# Patient Record
Sex: MALE | Race: ASIAN | NOT HISPANIC OR LATINO | URBAN - METROPOLITAN AREA
[De-identification: names, ages, dates, MRNs, and addresses within clinical notes are randomized per-mention and may not be internally consistent; named-entity substitution may affect disease eponyms.]

---

## 2018-05-26 ENCOUNTER — EMERGENCY (EMERGENCY)
Facility: HOSPITAL | Age: 63
LOS: 1 days | Discharge: HOME | End: 2018-05-26
Attending: EMERGENCY MEDICINE | Admitting: EMERGENCY MEDICINE
Payer: MEDICAID

## 2018-05-26 VITALS
SYSTOLIC BLOOD PRESSURE: 127 MMHG | HEART RATE: 86 BPM | DIASTOLIC BLOOD PRESSURE: 90 MMHG | TEMPERATURE: 98 F | RESPIRATION RATE: 18 BRPM

## 2018-05-26 DIAGNOSIS — Z93.1 GASTROSTOMY STATUS: Chronic | ICD-10-CM

## 2018-05-26 PROCEDURE — 99283 EMERGENCY DEPT VISIT LOW MDM: CPT

## 2018-05-26 NOTE — PROVIDER CONTACT NOTE (OTHER) - ASSESSMENT
SW contacted by medical team, Pt requires assistance to return home. SW met with Pt and family. Pt requires ambulance to return home. SHAWNA contacted [facility] [phone] spoke with staff Idris who stated Pt not eligible for transport with them. SHAWNA contacted [employer] [phone] unable to get a  confirmed ambulance via [facility] by 4:00 p.m. SHAWNA informed Pt, family and medical team. No further SW intervention required at this time. SHAWNA contacted by medical team, Pt requires assistance to return home. SW met with Pt and family. Pt requires ambulance to return home. SHAWNA contacted [facility] [phone] spoke with staff Idris who stated Pt not eligible for transport with them. SHAWNA contacted [employer] [phone] unable to get a  after hours staff.  SHAWNA contacted [facility]. [facility] [employer] [phone] spoke with Kerry who could not find Pt in system, not eligible with them.  She gave transport covered by [facility] [phone].  SHAWNA spoke with family who could provide medicaid # [id number].  [facility] contacted [facility] spoke with          for ambulance bill back pending auth.  confirmed ambulance via [facility] by  p.m. SHAWNA informed Pt, family and medical team. No further SHAWNA intervention required at this time. SW contacted by medical team, Pt requires assistance to return home. SW met with Pt and family. Pt requires ambulance to return home. SHAWNA contacted [facility] [phone] spoke with staff Idris who stated Pt not eligible for transport with them. SHAWNA contacted [employer] [phone] unable to get a  after hours staff.  SHAWNA contacted [facility]. [employer] [phone] spoke with Kerry who could not find Pt in system, not eligible with them.  She gave transport covered by [facility] [phone].  SHAWNA spoke with family who also provided medicaid # [id number].  SHAWNA contacted [facility] spoke with Muna for ambulance Trip#[id number] for ambulance bill back pending auth.  Confirmed ambulance via [facility] by 5:30p.m. SHAWNA informed Pt, family and medical team. No further SHAWNA intervention required at this time.

## 2018-05-26 NOTE — ED PROVIDER NOTE - OBJECTIVE STATEMENT
62M hx Alzheimer's, hosp. last year for PNA, had severely dec. po intake and PEG placed in hosp. Per son, pt. rec'd only short period of feeds in hosp, then was sent to [facility] but did not stay there because son says "[facility] was bad for him, had diff. caring for him and wouldn't feed him." Pt. has been home with home aide and family for 8 months and has not used PEG tube in that time. Aide states pt. is usual mental status and occ. lisas, called EMS due to seeing blood at PEG site and is more agitated from being in the hospital now. No recent fever/chills or illness, pt. does not usually pull at tube.

## 2018-05-26 NOTE — ED PROVIDER NOTE - MEDICAL DECISION MAKING DETAILS
62M c/o irritation and bleeding at PEG site noted this a.m. Son states has been slated for removal of PEG but has not been able to get GI MD to come to house. PMD comes to house monthly and for last 8 months family has been awaiting removal. Unable to flush PEG, currently unusable for feeds or meds and pt. taking all by mouth. Will remove PEG, evaluate for signs of deep infection and dc home.

## 2018-05-26 NOTE — ED PROVIDER NOTE - SKIN LOCATION #1
abdomen/PEG tube in place with surrounding ST swelling and blood, no pus, no fluctuance, no diffuse erythema, no warmth.